# Patient Record
Sex: MALE | HISPANIC OR LATINO | Employment: PART TIME | URBAN - METROPOLITAN AREA
[De-identification: names, ages, dates, MRNs, and addresses within clinical notes are randomized per-mention and may not be internally consistent; named-entity substitution may affect disease eponyms.]

---

## 2020-09-10 ENCOUNTER — OFFICE VISIT (OUTPATIENT)
Dept: URGENT CARE | Facility: CLINIC | Age: 20
End: 2020-09-10
Payer: COMMERCIAL

## 2020-09-10 ENCOUNTER — HOSPITAL ENCOUNTER (OUTPATIENT)
Dept: RADIOLOGY | Facility: MEDICAL CENTER | Age: 20
End: 2020-09-10
Attending: PHYSICIAN ASSISTANT
Payer: COMMERCIAL

## 2020-09-10 VITALS
RESPIRATION RATE: 12 BRPM | SYSTOLIC BLOOD PRESSURE: 108 MMHG | WEIGHT: 182 LBS | DIASTOLIC BLOOD PRESSURE: 72 MMHG | OXYGEN SATURATION: 100 % | BODY MASS INDEX: 29.25 KG/M2 | HEART RATE: 76 BPM | HEIGHT: 66 IN | TEMPERATURE: 97 F

## 2020-09-10 DIAGNOSIS — R10.9 ABDOMINAL PAIN, UNSPECIFIED ABDOMINAL LOCATION: ICD-10-CM

## 2020-09-10 DIAGNOSIS — N20.1 URETEROLITHIASIS: ICD-10-CM

## 2020-09-10 DIAGNOSIS — N13.2 HYDRONEPHROSIS WITH URINARY OBSTRUCTION DUE TO URETERAL CALCULUS: ICD-10-CM

## 2020-09-10 LAB
APPEARANCE UR: NORMAL
BILIRUB UR STRIP-MCNC: NEGATIVE MG/DL
COLOR UR AUTO: NORMAL
GLUCOSE UR STRIP.AUTO-MCNC: NEGATIVE MG/DL
KETONES UR STRIP.AUTO-MCNC: NEGATIVE MG/DL
LEUKOCYTE ESTERASE UR QL STRIP.AUTO: NEGATIVE
NITRITE UR QL STRIP.AUTO: NEGATIVE
PH UR STRIP.AUTO: 6 [PH] (ref 5–8)
PROT UR QL STRIP: NORMAL MG/DL
RBC UR QL AUTO: NORMAL
SP GR UR STRIP.AUTO: 1.03
UROBILINOGEN UR STRIP-MCNC: 0.2 MG/DL

## 2020-09-10 PROCEDURE — 99204 OFFICE O/P NEW MOD 45 MIN: CPT | Performed by: PHYSICIAN ASSISTANT

## 2020-09-10 PROCEDURE — 81002 URINALYSIS NONAUTO W/O SCOPE: CPT | Performed by: PHYSICIAN ASSISTANT

## 2020-09-10 PROCEDURE — 74176 CT ABD & PELVIS W/O CONTRAST: CPT

## 2020-09-10 RX ORDER — TAMSULOSIN HYDROCHLORIDE 0.4 MG/1
0.4 CAPSULE ORAL
Qty: 30 CAP | Refills: 0 | Status: SHIPPED | OUTPATIENT
Start: 2020-09-10 | End: 2020-10-01

## 2020-09-10 SDOH — HEALTH STABILITY: MENTAL HEALTH: HOW OFTEN DO YOU HAVE A DRINK CONTAINING ALCOHOL?: NEVER

## 2020-09-10 ASSESSMENT — ENCOUNTER SYMPTOMS
MYALGIAS: 0
FLANK PAIN: 0
COUGH: 0
VOMITING: 0
NAUSEA: 1
FEVER: 0
SHORTNESS OF BREATH: 0
ABDOMINAL PAIN: 1
WEIGHT LOSS: 0
DIARRHEA: 0
HEADACHES: 0
CONSTIPATION: 0
ANOREXIA: 0
PALPITATIONS: 0
WHEEZING: 0
DIZZINESS: 0
TINGLING: 0

## 2020-09-10 NOTE — PROGRESS NOTES
Subjective:   Donlad Coker is a 20 y.o. male who presents for Abdominal Pain (x 2 days on R side and nausea. )      Abdominal Pain  This is a new (He has had 3-4 episodes of right-sided lateral abdominal pain in the last 2 days.  He states that a pain is a sharp 20 sensation and last for a minute or less.  The pain does not radiate.) problem. The current episode started yesterday. The onset quality is sudden. The problem occurs intermittently. The problem has been waxing and waning. Pain location: Right abdominal side pain. The pain is moderate. The quality of the pain is colicky. The abdominal pain does not radiate. Associated symptoms include nausea. Pertinent negatives include no anorexia, constipation, diarrhea, dysuria, fever, frequency, headaches, hematuria, melena, myalgias, vomiting or weight loss. The pain is aggravated by movement. He has tried nothing for the symptoms.       Review of Systems   Constitutional: Negative for fever, malaise/fatigue and weight loss.   Respiratory: Negative for cough, shortness of breath and wheezing.    Cardiovascular: Negative for chest pain and palpitations.   Gastrointestinal: Positive for abdominal pain and nausea. Negative for anorexia, constipation, diarrhea, melena and vomiting.   Genitourinary: Negative for dysuria, flank pain, frequency, hematuria and urgency.   Musculoskeletal: Negative for myalgias.   Neurological: Negative for dizziness, tingling and headaches.       Medications:    • This patient does not have an active medication from one of the medication groupers.    Allergies: Patient has no known allergies.    Problem List: Donald Coker does not have a problem list on file.    Surgical History:  No past surgical history on file.    Past Social Hx: Donald Coker  reports that he has never smoked. He has never used smokeless tobacco. He reports that he does not drink alcohol or use drugs.     Past Family Hx:  Donald Coker family  "history is not on file.     Problem list, medications, and allergies reviewed by myself today in Epic.     Objective:     /72   Pulse 76   Temp 36.1 °C (97 °F) (Temporal)   Resp 12   Ht 1.676 m (5' 6\")   Wt 82.6 kg (182 lb)   SpO2 100%   BMI 29.38 kg/m²     Physical Exam  Constitutional:       General: He is not in acute distress.     Appearance: Normal appearance. He is not ill-appearing, toxic-appearing or diaphoretic.   HENT:      Head: Normocephalic and atraumatic.   Eyes:      Conjunctiva/sclera: Conjunctivae normal.   Neck:      Musculoskeletal: Normal range of motion. No muscular tenderness.   Cardiovascular:      Rate and Rhythm: Normal rate and regular rhythm.      Pulses: Normal pulses.      Heart sounds: Normal heart sounds.   Pulmonary:      Effort: Pulmonary effort is normal.      Breath sounds: Normal breath sounds. No wheezing, rhonchi or rales.   Abdominal:      General: Bowel sounds are normal. There is no distension.      Palpations: Abdomen is soft. There is no mass.      Tenderness: There is no abdominal tenderness. There is no right CVA tenderness, left CVA tenderness, guarding or rebound.      Hernia: No hernia is present.      Comments: Negative McBurney's point tenderness.  Negative Erazo sign  Negative psoas sign  Negative obturator sign   Lymphadenopathy:      Cervical: No cervical adenopathy.   Skin:     General: Skin is warm and dry.      Capillary Refill: Capillary refill takes less than 2 seconds.   Neurological:      General: No focal deficit present.      Mental Status: He is alert and oriented to person, place, and time. Mental status is at baseline.   Psychiatric:         Mood and Affect: Mood normal.         Thought Content: Thought content normal.       Urine analysis: Blood large.  Trace protein.  All others within normal limits.  CT renal colic:   FINDINGS:  Lung bases are unremarkable.     There is hepatic steatosis.     The unopacified spleen, adrenal glands, " pancreas, and gallbladder are     No renal calculi are identified. An obstructing 3 mm calculus in the proximal right ureter results in mild right hydroureteronephrosis and periureteral stranding. No additional renal calculi identified.     The bowel is grossly unremarkable. The appendix is normal in appearance.     The bladder is unremarkable.     No significant free fluid or adenopathy is identified.     No suspicious bony lesions.     IMPRESSION:     1.  Obstructing 3 mm calculus in the proximal right ureter results in mild right hydroureteronephrosis and perinephric stranding.     2.  Hepatic steatosis    Assessment/Plan:     Diagnosis and associated orders:   1. Abdominal pain, unspecified abdominal location    - POCT Urinalysis  - CT-RENAL COLIC EVALUATION(A/P W/O); Future    2. Ureterolithiasis    3. Hydronephrosis with urinary obstruction due to ureteral calculus    - tamsulosin (FLOMAX) 0.4 MG capsule; Take 1 Cap by mouth ONE-HALF HOUR AFTER BREAKFAST for 21 days.  Dispense: 30 Cap; Refill: 0  - REFERRAL TO UROLOGY       Comments/MDM:       • Differential diagnoses and treatment options were discussed with the patient at length today.  Differentials at this time include nephrolithiasis versus bowel gas versus musculoskeletal strain versus appendicitis.  Patient's urinalysis revealed large amounts of blood.  At this time a CT renal colic was ordered to evaluate for nephrolithiasis.  I will follow-up with the patients results once available.  • Left a voicemail on the patient's phone at 5:15 PM informed him of his CT scan results.  3 mm calculus in the right proximal ureter resulting in mild hydronephrosis.  I sent a prescription for Flomax.  Take as directed.  I sent a referral to urology for follow-up.  • Encouraged to call with any questions or concerns.  • Discussed potential red flag symptoms with the patient if any of these present return to the clinic or nearest emergency department.            Differential diagnosis, natural history, supportive care, and indications for immediate follow-up discussed.    Advised the patient to follow-up with the primary care physician for recheck, reevaluation, and consideration of further management.    Please note that this dictation was created using voice recognition software. I have made reasonable attempt to correct obvious errors, but I expect that there are errors of grammar and possibly content that I did not discover before finalizing the note.    This note was electronically signed by SEVERINO Stringer PA-C